# Patient Record
Sex: FEMALE | Race: WHITE | NOT HISPANIC OR LATINO
[De-identification: names, ages, dates, MRNs, and addresses within clinical notes are randomized per-mention and may not be internally consistent; named-entity substitution may affect disease eponyms.]

---

## 2017-12-18 ENCOUNTER — FORM ENCOUNTER (OUTPATIENT)
Age: 62
End: 2017-12-18

## 2017-12-19 ENCOUNTER — OUTPATIENT (OUTPATIENT)
Dept: OUTPATIENT SERVICES | Facility: HOSPITAL | Age: 62
LOS: 1 days | End: 2017-12-19
Payer: COMMERCIAL

## 2017-12-19 ENCOUNTER — APPOINTMENT (OUTPATIENT)
Dept: ORTHOPEDIC SURGERY | Facility: CLINIC | Age: 62
End: 2017-12-19
Payer: COMMERCIAL

## 2017-12-19 VITALS
DIASTOLIC BLOOD PRESSURE: 80 MMHG | WEIGHT: 175 LBS | BODY MASS INDEX: 27.47 KG/M2 | HEIGHT: 67 IN | SYSTOLIC BLOOD PRESSURE: 120 MMHG

## 2017-12-19 DIAGNOSIS — Z78.9 OTHER SPECIFIED HEALTH STATUS: ICD-10-CM

## 2017-12-19 DIAGNOSIS — Z87.39 PERSONAL HISTORY OF OTHER DISEASES OF THE MUSCULOSKELETAL SYSTEM AND CONNECTIVE TISSUE: ICD-10-CM

## 2017-12-19 DIAGNOSIS — Z87.898 PERSONAL HISTORY OF OTHER SPECIFIED CONDITIONS: ICD-10-CM

## 2017-12-19 PROCEDURE — 73564 X-RAY EXAM KNEE 4 OR MORE: CPT

## 2017-12-19 PROCEDURE — 73564 X-RAY EXAM KNEE 4 OR MORE: CPT | Mod: 26,50

## 2017-12-19 PROCEDURE — 99203 OFFICE O/P NEW LOW 30 MIN: CPT

## 2017-12-19 RX ORDER — PANTOPRAZOLE 40 MG/1
TABLET, DELAYED RELEASE ORAL
Refills: 0 | Status: ACTIVE | COMMUNITY

## 2017-12-19 RX ORDER — GLUC/MSM/COLGN2/HYAL/ANTIARTH3 375-375-20
TABLET ORAL
Refills: 0 | Status: ACTIVE | COMMUNITY

## 2017-12-19 RX ORDER — CALCIUM CARBONATE/VITAMIN D3 600MG-5MCG
600-200 TABLET ORAL
Refills: 0 | Status: ACTIVE | COMMUNITY

## 2017-12-19 RX ORDER — TRAZODONE HYDROCHLORIDE 300 MG/1
TABLET ORAL
Refills: 0 | Status: ACTIVE | COMMUNITY

## 2017-12-19 RX ORDER — DICLOFENAC SODIUM 1 %
KIT TOPICAL
Refills: 0 | Status: ACTIVE | COMMUNITY

## 2017-12-19 RX ORDER — MULTIVITAMIN
TABLET ORAL
Refills: 0 | Status: ACTIVE | COMMUNITY

## 2017-12-19 RX ORDER — EZETIMIBE AND SIMVASTATIN 10; 80 MG/1; MG/1
TABLET ORAL
Refills: 0 | Status: ACTIVE | COMMUNITY

## 2018-03-29 ENCOUNTER — APPOINTMENT (OUTPATIENT)
Dept: OPHTHALMOLOGY | Facility: CLINIC | Age: 63
End: 2018-03-29
Payer: COMMERCIAL

## 2018-03-29 DIAGNOSIS — H04.123 DRY EYE SYNDROME OF BILATERAL LACRIMAL GLANDS: ICD-10-CM

## 2018-03-29 PROCEDURE — 92012 INTRM OPH EXAM EST PATIENT: CPT

## 2018-04-23 ENCOUNTER — TRANSCRIPTION ENCOUNTER (OUTPATIENT)
Age: 63
End: 2018-04-23

## 2019-09-10 ENCOUNTER — TRANSCRIPTION ENCOUNTER (OUTPATIENT)
Age: 64
End: 2019-09-10

## 2019-09-16 ENCOUNTER — FORM ENCOUNTER (OUTPATIENT)
Age: 64
End: 2019-09-16

## 2019-09-17 ENCOUNTER — APPOINTMENT (OUTPATIENT)
Dept: ORTHOPEDIC SURGERY | Facility: CLINIC | Age: 64
End: 2019-09-17
Payer: COMMERCIAL

## 2019-09-17 ENCOUNTER — OUTPATIENT (OUTPATIENT)
Dept: OUTPATIENT SERVICES | Facility: HOSPITAL | Age: 64
LOS: 1 days | End: 2019-09-17
Payer: COMMERCIAL

## 2019-09-17 VITALS
BODY MASS INDEX: 28.09 KG/M2 | OXYGEN SATURATION: 98 % | SYSTOLIC BLOOD PRESSURE: 110 MMHG | HEART RATE: 62 BPM | DIASTOLIC BLOOD PRESSURE: 70 MMHG | HEIGHT: 67 IN | WEIGHT: 179 LBS

## 2019-09-17 DIAGNOSIS — M17.0 BILATERAL PRIMARY OSTEOARTHRITIS OF KNEE: ICD-10-CM

## 2019-09-17 PROCEDURE — 73564 X-RAY EXAM KNEE 4 OR MORE: CPT

## 2019-09-17 PROCEDURE — 73564 X-RAY EXAM KNEE 4 OR MORE: CPT | Mod: 26,50

## 2019-09-17 PROCEDURE — 99213 OFFICE O/P EST LOW 20 MIN: CPT

## 2019-09-24 ENCOUNTER — APPOINTMENT (OUTPATIENT)
Dept: ORTHOPEDIC SURGERY | Facility: CLINIC | Age: 64
End: 2019-09-24
Payer: COMMERCIAL

## 2019-09-24 VITALS — WEIGHT: 179 LBS | RESPIRATION RATE: 16 BRPM | BODY MASS INDEX: 28.09 KG/M2 | HEIGHT: 67 IN

## 2019-09-24 DIAGNOSIS — Z82.49 FAMILY HISTORY OF ISCHEMIC HEART DISEASE AND OTHER DISEASES OF THE CIRCULATORY SYSTEM: ICD-10-CM

## 2019-09-24 DIAGNOSIS — M99.02 SEGMENTAL AND SOMATIC DYSFUNCTION OF THORACIC REGION: ICD-10-CM

## 2019-09-24 PROCEDURE — 99214 OFFICE O/P EST MOD 30 MIN: CPT

## 2019-09-24 PROCEDURE — 73030 X-RAY EXAM OF SHOULDER: CPT | Mod: 50

## 2019-09-24 NOTE — PHYSICAL EXAM
[de-identified] : General: Well-nourished, well-developed, alert, and in no acute distress.\par Head: Normocephalic.\par Eyes: Pupils equal round reactive to light and accommodation, extraocular muscles intact, normal sclera.\par Nose: No nasal discharge.\par Cardiac: Regular rate. Extremities are warm and well perfused. Distal pulses are symmetric bilaterally.\par Respiratory: No labored breathing.\par Extremities: Sensation is intact distally bilaterally.  Distal pulses are symmetric bilaterally\par Neurologic: No focal deficits\par Skin: Normal skin color, texture, and turgor\par Psychiatric: Normal affect\par \par RIGHT Shoulder:\par \par Inspection: No bruising or rash, no bony prominence \par Joint Effusion: No\par Range of Motion: normal forward flexion, abduction, internal and external rotation \par Palpation: no AC joint pain, no pain at bicipital groove pain, no pain in the clavicle, no pain greater tuberosity, no pain at glenohumeral joint\par Distal Pulses: Intact\par Upper extremity reflexes: 2+ and symmetric \par Shoulder strength:  5/5 resisted internal rotation and external rotation, 5/5 supraspinatus, 5/5 subscapularis  \par Upper extremity sensation: Intact\par \par Special Tests: \par Empty can: negative\par Lift off test: negative\par Cross Arm: negative\par Neers: negative\par Enriquez: negative\par Speeds: negative\par Apprehension: negative\par Obriens: negative\par Dial Test: negative\par \par LEFT Shoulder:\par \par Inspection: No bruising or rash, no bony prominence, POSTERIORLY, THERE IS MILD SCAPULAR ELEVATION AND PROMINENCE OF THE MEDIAL BORDER OF THE SCAPULAR WITH SCAPULAR RETRACTION\par Joint Effusion: No\par Range of Motion: normal forward flexion, abduction, internal and external rotation \par Palpation: MULTIPLE TRIGGER POINTS ALONG RHOMBOID, TRAPEZIUS, LEVATOR SCAPULAE, no AC joint pain, no pain at bicipital groove pain, no pain in the clavicle, no pain greater tuberosity, no pain at glenohumeral joint\par Distal Pulses: Intact\par Upper extremity reflexes: 2+ and symmetric \par Shoulder strength:  5/5 resisted internal rotation and external rotation, 5/5 supraspinatus, 5/5 subscapularis  \par Upper extremity sensation: Intact\par \par Special Tests: \par Empty can: negative\par Lift off test: negative\par Cross Arm: negative\par Neers: negative\par Enriquez: negative\par Speeds: negative\par Apprehension: negative\par Obriens: negative\par Dial Test: negative\par \par Cervical Spine:\par \par Inspection:\par Alignment/Posture: No scoliosis or deformity. \par Spasm not noted. \par No scars\par \par Palpation:   \par Midline:  NO pain\par Paracervical:    NO pain\par Trapezius:   MILD ON LEFT pain\par Levator Scapulae:  MILD ON LEFT pain\par Rhomboids:  MILD ON LEFT pain\par \par \par ROM: \par Flexion:  50 degrees, without pain.\par Extension:  60 degrees without pain  \par Side Bendin degrees without pain \par Rotation:  80 degrees with SOME DISCOMFORT WITH RIGHT ROTATION REFERRED TO PERISCAPULAR AREA\par \par \par Strength: \par 5/5 Flexion, Extension, Sidebending, Rotation\par 5/5 Shoulder Abduction, 5/5 elbow flexion/wrist extension, 5/5 elbow extension/wrist flexion, 5/5 thumb extension, 5/5 finger abduction\par \par NEURO  \par Sensation:   Intact throughout the upper and lower extremity\par DTR's:  Symmetric throughout the upper and lower extremity.  \par \par Other tests: \par Spurling's Maneuver: NEGATIVE\par \par Vascular:  \par No cyanosis, clubbing, edema.  \par Intact pulses distally\par \par  [de-identified] : X-rays of right and left shoulder-Multiple views were reviewed with the patient in detail. There is no evidence of acute fracture dislocation. There is no evidence of joint effusion.There is mild a.c. joint arthrosis

## 2019-09-24 NOTE — HISTORY OF PRESENT ILLNESS
[de-identified] : The patient is a 64-year-old woman presenting with left posterior shoulder pain.\par \par The patient is a 64-year-old, right-hand-dominant woman who presents with a several year history of chronic, intermittent, left periscapular pain. She states that she has a chronic history of "winging"of the left scapula which has been present since childhood. She has had no limitation of range of motion.  She states the pain is mostly localized to the inferomedial aspect of her left scapula. She denies snapping or clicking of her shoulder. She has no history of shoulder injury. She has no significant neck pain, but certain cervical motions are for some discomfort to her shoulder blades. She denies distal numbness, weakness, paresthesias.\par \par Patient rated 8/10, constant, described as burning, throbbing, improved with anti-inflammatories. [8] : a current pain level of 8/10

## 2019-09-24 NOTE — DISCUSSION/SUMMARY
[de-identified] : The patient is a 64-year-old, right-hand-dominant woman who presents with a chronic history of intermittent, left periscapular discomfort, with mild prominence of the medial scapular border. Her pain is most likely secondary to scapulothoracic dysfunction. She has a range of motion, and it does not appear that she has scapulothoracic dissociation.\par ------------------------------------------------------------------------------------------------------------------------------------------------------------------------------------\susie I personally reviewed imaging. Imaging results were reviewed with the patient in detail. All questions were answered appropriately.\par ------------------------------------------------------------------------------------------------------------------------------------------------------------------------------------\par Treatment options were discussed with the patient in detail. The patient would benefit from a course of physical therapy to focus on cervical range of motion, scapular stabilization, modalities, soft tissue mobilization. She was given a list of some home exercises.\par \par She was counseled on activity modification.\par \par Patient was instructed to follow up in 4-6 weeks.\par \par -------------------------------------------------------------------------------------------------------------------------------------------\par Patient appreciates and agrees with current plan.  All of the patient's questions were answered appropriately.\par \par This note was generated using dragon medical dictation software.  A reasonable effort has been made for proofreading its contents, but typos may still remain.  If there are any questions or points of clarification needed please notify my office.

## 2019-09-30 NOTE — REVIEW OF SYSTEMS
[Joint Pain] : joint pain [Joint Swelling] : no joint swelling [Joint Stiffness] : joint stiffness [Negative] : Heme/Lymph

## 2019-09-30 NOTE — END OF VISIT
[FreeTextEntry3] : All medical record entries made by MOR Oshea, acting as a scribe for this encounter under the direction of Reuben Parsons MD . I have reviewed the chart and agree that the record accurately reflects my personal performance of the history, physical exam, assessment and plan. I have also personally directed, reviewed, and agreed with the chart.

## 2019-09-30 NOTE — HISTORY OF PRESENT ILLNESS
[de-identified] : Benita returns today for evaluation of both knees. She has a h.o PF issues and has managed well in the past with activity modification. She reports a recent two week increase in anterior knee pain and popping. She has had increased stiffness and difficulty with stairs. The pain started in the left knee and over the last three days the right knee has started to hurt as well. She just started applying Voltaren Gel with some improvement. Her pain is anterior and associated with clicking. She has difficulty with stairs. She denies any locking or buckling.

## 2019-09-30 NOTE — PHYSICAL EXAM
[de-identified] : The patient is a well developed, well nourished female in no apparent distress. She is alert and oriented X 3 with a pleasant mood and appropriate affect. \par \par On physical examination of the right knee, there is full range of motion. The patient walks with a normal gait and stands in neutral alignment. There is no effusion. No warmth or erythema is noted. The patella is tender to palpation medially and laterally. There is patellofemoral crepitus noted. The apprehension and grind tests are negative. The extensor mechanism is intact. There is no joint line tenderness. The Joon sign is absent. The Lachman and pivot shift tests are negative. There is no varus or valgus laxity at 0 or 30 degrees. No posterolateral or anteromedial laxity is noted. No masses are palpable. No other soft tissue or bony tenderness is noted. There is some tenderness noted on palpation of the IT band. Quadriceps weakness is noted. Neurovascular function is intact.  \par \par On physical examination of the left knee, there is full range of motion. The patient walks with a normal gait and stands in neutral alignment. There is no effusion. No warmth or erythema is noted. The patella is tender to palpation medially and laterally. There is patellofemoral crepitus noted. The apprehension and grind tests are negative. The extensor mechanism is intact. There is no joint line tenderness. The Joon sign is absent. The Lachman and pivot shift tests are negative. There is no varus or valgus laxity at 0 or 30 degrees. No posterolateral or anteromedial laxity is noted. No masses are palpable. No other soft tissue or bony tenderness is noted. There is some tenderness noted on palpation of the IT band. Quadriceps weakness is noted. Neurovascular function is intact.   [de-identified] : Radiographs show progressive DJD in both knees,

## 2019-09-30 NOTE — DISCUSSION/SUMMARY
[de-identified] : Benita has symptomatic DJD in both knees. She will begin a course of supervised PT. If unimproved we will consider Ha injections in the future. ALl questions were answered. She will call if any issues arise.

## 2019-10-15 ENCOUNTER — APPOINTMENT (OUTPATIENT)
Dept: ORTHOPEDIC SURGERY | Facility: CLINIC | Age: 64
End: 2019-10-15

## 2019-11-08 ENCOUNTER — RESULT REVIEW (OUTPATIENT)
Age: 64
End: 2019-11-08

## 2019-11-08 ENCOUNTER — OUTPATIENT (OUTPATIENT)
Dept: OUTPATIENT SERVICES | Facility: HOSPITAL | Age: 64
LOS: 1 days | Discharge: HOME | End: 2019-11-08

## 2019-11-11 LAB — SURGICAL PATHOLOGY STUDY: SIGNIFICANT CHANGE UP

## 2019-11-13 DIAGNOSIS — C67.9 MALIGNANT NEOPLASM OF BLADDER, UNSPECIFIED: ICD-10-CM

## 2019-11-13 DIAGNOSIS — D09.0 CARCINOMA IN SITU OF BLADDER: ICD-10-CM

## 2019-11-13 DIAGNOSIS — E78.5 HYPERLIPIDEMIA, UNSPECIFIED: ICD-10-CM

## 2019-11-13 DIAGNOSIS — K21.9 GASTRO-ESOPHAGEAL REFLUX DISEASE WITHOUT ESOPHAGITIS: ICD-10-CM

## 2019-11-13 DIAGNOSIS — D49.4 NEOPLASM OF UNSPECIFIED BEHAVIOR OF BLADDER: ICD-10-CM

## 2019-11-13 DIAGNOSIS — F41.9 ANXIETY DISORDER, UNSPECIFIED: ICD-10-CM

## 2019-11-13 DIAGNOSIS — G47.00 INSOMNIA, UNSPECIFIED: ICD-10-CM

## 2020-06-16 ENCOUNTER — TRANSCRIPTION ENCOUNTER (OUTPATIENT)
Age: 65
End: 2020-06-16

## 2021-08-27 ENCOUNTER — NON-APPOINTMENT (OUTPATIENT)
Age: 66
End: 2021-08-27

## 2021-09-01 ENCOUNTER — APPOINTMENT (OUTPATIENT)
Dept: ORTHOPEDIC SURGERY | Facility: CLINIC | Age: 66
End: 2021-09-01
Payer: MEDICARE

## 2021-09-01 VITALS — HEART RATE: 73 BPM | DIASTOLIC BLOOD PRESSURE: 77 MMHG | OXYGEN SATURATION: 95 % | SYSTOLIC BLOOD PRESSURE: 126 MMHG

## 2021-09-01 DIAGNOSIS — M19.019 PRIMARY OSTEOARTHRITIS, UNSPECIFIED SHOULDER: ICD-10-CM

## 2021-09-01 DIAGNOSIS — M75.41 IMPINGEMENT SYNDROME OF RIGHT SHOULDER: ICD-10-CM

## 2021-09-01 PROCEDURE — 99212 OFFICE O/P EST SF 10 MIN: CPT | Mod: 25

## 2021-09-01 PROCEDURE — 20611 DRAIN/INJ JOINT/BURSA W/US: CPT | Mod: RT

## 2021-09-01 NOTE — DISCUSSION/SUMMARY
[Medication Risks Reviewed] : Medication risks reviewed [de-identified] : The patient is a 66 year old woman presenting with a several week history of acute, atraumatic right shoulder pain likely secondary to AC joint arthritis with impingement syndrome.\par \par Imaging/Diagnostics/Medical Records were interpreted and/or reviewed and results were reviewed with the patient in detail.  All questions were answered appropriately.\par \par The patient was counseled on the natural progression of the problem(s) today and potential complications of diagnoses.  The patient was provided reassurance today.\par \par After informed consent, and explanation of risks, benefits, alternatives, adverse effects of injection, which includes but is not limited to infection, bleeding, allergic reaction, swelling, soft tissue weakening/tendon rupture, neurovascular injury, injection site complication, fat atrophy, skin depigmentation, failure to improve symptoms, the patient would like to proceed with the procedure - RIGHT AC JOINT ULTRASOUND-GUIDED CORTICOSTEROID INJECTION. See procedure note above. Patient tolerated the procedure well. The patient was provided with postinjection instructions.\par \par The patient was also provided some general home exercises.  The patient was counseled on activity modification.\par \par Follow-up in 4 weeks or as needed.\par \par ------------------------------------------------------------------------------------------------------------------\par Patient appreciates and agrees with current plan.\par \par The patient's diagnosis above was evaluated by me, personally.  Diagnostic Testing and treatment options were discussed with the patient in detail.  The risks/benefits/potential complications of diagnostic testing/treatments were described in detail.  \par \par This note was generated using a mixture of manual typing and dragon medical dictation software.  A reasonable effort has been made for proofreading its contents, but typos may still remain.  If there are any questions or points of clarification needed please notify my office.\par \par \par >15 minutes of time was spent in total for the encounter.  >50% of the time spent was on face-to-face counseling/coordination of care and medical-decision making for this patient.\par

## 2021-09-01 NOTE — PHYSICAL EXAM
[de-identified] : General: Well-nourished, well-developed, alert, and in no acute distress.\par Head: Normocephalic.\par Eyes: Pupils equal round reactive to light and accommodation, extraocular muscles intact, normal sclera.\par Nose: No nasal discharge.\par Cardiac: Regular rate. Extremities are warm and well perfused. Distal pulses are symmetric bilaterally.\par Respiratory: No labored breathing.\par Extremities: Sensation is intact distally bilaterally.  Distal pulses are symmetric bilaterally\par Lymphatic: No regional lymphadenopathy, no lymphedema\par Neurologic: No focal deficits\par Skin: Normal skin color, texture, and turgor\par Psychiatric: Normal affect\par MSK: as noted above/below\par \par \par \par RIGHT SHOULDER:\par  \par Inspection:no bruising, rash, erythema, AC JOINT PROMINENCE, \par Joint Effusion:no\par ROM:normal Forward Flexion, Abduction , Internal Rotation: , External Rotation \par Palpation: PAIN AT AC JOINT, Bicipital Groove Pain , GH joint pain , Clavicle pain , GT pain \par Distal Pulses:normal\par Upper Extremity Reflexes:2+\par Shoulder Strength: 5/5 \par Upper Extremity Sensation: normal\par \par Special Tests:\par Empty Can: Negative \par Cross Arm: POSITIVE\par Neer: POSITIVE\par Enriquez: POSITIVE\par Speed: Negative\par \par \par LEFT SHOULDER:\par  \par Inspection:no bruising, rash, erythema, deformity\par Joint Effusion:no\par ROM:normal Forward Flexion, Abduction , Internal Rotation: , External Rotation \par Palpation: NO AC joint pain, Bicipital Groove Pain , GH joint pain , Clavicle pain , GT pain \par Distal Pulses:normal\par Upper Extremity Reflexes:2+\par Shoulder Strength: 5/5 \par Upper Extremity Sensation: normal\par \par  [de-identified] : Xray RIGHT Shoulder - Multiple views were reviewed with the patient in detail.  There is no acute fracture or dislocation.  There is no joint effusion.  AC joint widening and arthrosis.\par

## 2021-09-01 NOTE — HISTORY OF PRESENT ILLNESS
[de-identified] : The patient is a 64-year-old, rhd woman presenting with new right shoulder pain.\par \par Patient previously seen about 2 years ago for left scapulothoracic dysfunction.  She was prescribed PT.\par \par She presents with a several week history of acute, atraumatic right superior shoulder pain.  The patient denies precipitating injury or trauma.  She may have exacerbated her pain trying to reach back for an item in her car with her arm extended/abducted.  The patient denies distal numbness, weakness, paresthesias.  Pain with reaching.  Occasional nighttime pain.  Pain refractory to Advil.\par \par Pain is moderate in intensity, described as sharp, improved with rest, worse with reaching.

## 2021-09-01 NOTE — PROCEDURE
[de-identified] : Ultrasound Guided Injection \par Indication: Ensure placement within the acromioclavicular joint,  without damaging the tendons\par \par Utlizing the [employer] portable ultrasound machine, the Linear transducer, sterilized probe, using ultrasound guidance with the probe in the long axis, utilizing an out-of-plane approach, was used for the following injection:\par \par Injection: RIGHT ACROMIOCLAVICULAR JOINT INJECTION\par Indication: AC JOINT ARTHRITIS\par \par A discussion was had with the patient regarding this procedure and all questions were answered. All risks, benefits and alternatives were discussed. These included but were not limited to bleeding, infection, injection site reaction/complication and allergic reaction. A timeout was performed prior to the procedure to ensure proper side.  Utilizing ultrasound guidance, the ac joint was visualized.  Alcohol was used to clean and sterilize the skin over the superior aspect of the shoulder. A 22-gauge needle was used to inject 2cc of lidocaine 1% without epinephrine and 1cc of KENALOG into the glenohumeral space/recess.   A sterile bandage was then applied. The patient tolerated the procedure well and there were no complications.\par

## 2022-01-11 ENCOUNTER — APPOINTMENT (OUTPATIENT)
Dept: ORTHOPEDIC SURGERY | Facility: CLINIC | Age: 67
End: 2022-01-11

## 2022-09-07 ENCOUNTER — NON-APPOINTMENT (OUTPATIENT)
Age: 67
End: 2022-09-07

## 2022-12-05 ENCOUNTER — APPOINTMENT (OUTPATIENT)
Dept: INTERNAL MEDICINE | Facility: CLINIC | Age: 67
End: 2022-12-05

## 2024-04-09 ENCOUNTER — APPOINTMENT (OUTPATIENT)
Dept: ORTHOPEDIC SURGERY | Facility: CLINIC | Age: 69
End: 2024-04-09

## 2024-12-17 ENCOUNTER — NON-APPOINTMENT (OUTPATIENT)
Age: 69
End: 2024-12-17

## 2025-01-16 ENCOUNTER — APPOINTMENT (OUTPATIENT)
Dept: ORTHOPEDIC SURGERY | Facility: AMBULATORY SURGERY CENTER | Age: 70
End: 2025-01-16